# Patient Record
Sex: FEMALE | Race: WHITE
[De-identification: names, ages, dates, MRNs, and addresses within clinical notes are randomized per-mention and may not be internally consistent; named-entity substitution may affect disease eponyms.]

---

## 2018-09-29 ENCOUNTER — HOSPITAL ENCOUNTER (EMERGENCY)
Dept: HOSPITAL 68 - ERH | Age: 33
End: 2018-09-29
Payer: COMMERCIAL

## 2018-09-29 VITALS — SYSTOLIC BLOOD PRESSURE: 122 MMHG | DIASTOLIC BLOOD PRESSURE: 82 MMHG

## 2018-09-29 VITALS — HEIGHT: 68 IN | BODY MASS INDEX: 18.94 KG/M2 | WEIGHT: 125 LBS

## 2018-09-29 DIAGNOSIS — L23.81: Primary | ICD-10-CM

## 2018-09-29 NOTE — ED SKIN/ALLERGY COMPLAINT
History of Present Illness
 
General
Chief Complaint: General Adult
Stated Complaint: RASH TO ARMS AND STOMACH X3 DAYS
Source: patient, old records
Exam Limitations: no limitations
 
Vital Signs & Intake/Output
Vital Signs & Intake/Output
 Vital Signs
 
 
Date Time Temp Pulse Resp B/P B/P Pulse O2 O2 Flow FiO2
 
     Mean Ox Delivery Rate 
 
09/29 0825 99.9 90 16 122/82  96 Room Air  
 
 
 
Allergies
Coded Allergies:
NO KNOWN ALLERGIES (DENIES) (05/01/16)
 
Reconcile Medications
Bupropion Hydrochloride (Wellbutrin XL) 300 MG T24   1 TAB PO DAILY depression  
(Reported)
     Reason to Stop at ADM:held on admission due to alcohol intoxication
Hydroxyzine Pamoate (Vistaril) 25 MG CAPSULE   1 CAP PO TID PRN ITCH
Ibuprofen 600 MG TABLET   1 TAB PO Q6P PRN pain
     with food
Lisdexamfetamine Dimesylate (Vyvanse) 60 MG CAP   1 CAP PO QAM ADD  (Reported)
Medroxyprogesterone Acetate (Depo-Provera) 150 MG/ML SYRINGE   1 ML IM Q3M BIRTH
CONTROL  (Reported)
Methylprednisolone. (Medrol) 4 MG TAB.DS.PK   1 DP PO AD CONTACT DERMATITIS
     6 on day 1 then reduce by one tablet daily until gone
Tramadol HCl (Ultram) 50 MG TABLET   1 TAB PO Q6P PRN severe pain
 
Triage Note:
PT PRESENTS TO THE ER WITH RASH TO STOMACH AND
 ARMS.  PT STATES "THE ONLY THING I DID DIFFERENT
 WAS PLAY WITH A HAMSTER"
 PT STATES THE RASH IS ITCHY, NOT PAINFUL.
Triage Nurses Notes Reviewed? yes
Pregnant: No
Patient currently breastfeeds: No
HPI:
Patient presents to the emergency department with an itchy rash on her arms and 
torso.  The only thing that she can think of was a few days ago she was playing 
with friends hamster and hips was crawling all over her.  Patient has not 
noticed any fleas.  There is no rash on her legs nor her back.  The rash is 
itchy.  The rash is not spreading.  She first noticed the rash the day after 
playing with a hamster.  It is slightly better however still there.  There is no
pain.  There are no fevers or chills.
 
Past History
 
Travel History
Traveled to [place] past 21 day No
 
Medical History
Any Pertinent Medical History? see below for history
Neurological: NONE
EENT: NONE
Cardiovascular: NONE
Respiratory: NONE
Gastrointestinal: NONE
Hepatic: NONE
Renal: NONE
Musculoskeletal: R FOOT BUNION
Psychiatric: alcohol dependence, anxiety
Endocrine: NONE
Blood Disorders: NONE
Cancer(s): NONE
GYN/Reproductive: NONE
History of MRSA: No
History of VRE: No
History of CDIFF: No
Tetanus Vaccine: 03/22/12
 
Surgical History
Surgical History: non-contributory
 
Psychosocial History
Who do you live with Other (see notes)
Services at Home None
What is your primary language English
Tobacco Use: Never used
ETOH Use: heavy use
Illicit Drug Use: marijuana
 
Family History
Hx Contributory? No
 
Review of Systems
 
Review of Systems
Constitutional:
Reports: no symptoms. 
Respiratory:
Reports: no symptoms. 
Cardiovascular:
Reports: no symptoms. 
Musculoskeletal:
Reports: no symptoms. 
Skin:
Reports: see HPI, rash. 
Neurological/Psychological:
Reports: no symptoms. 
Immunologic/Allergic:
Reports: no symptoms. 
 
Physical Exam
 
Physical Exam
General Appearance: well developed/nourished, alert, awake, mild distress
Eyes:
Bilateral: PERRL, EOMI. 
Neck: normal inspection, supple
Respiratory: normal breath sounds, chest non-tender, no respiratory distress, 
lungs clear
Cardiovascular: regular rate/rhythm, normal peripheral pulses
Neurologic/Psych: no motor/sensory deficits, awake, alert, oriented x 3, normal 
gait, normal mood/affect
Skin: rash
Skin Problem Location: upper extremities, torso
Skin Problem Character: papules
Lymphatic: NO LAD
 
Progress
Differential Diagnosis: contact dermatitis
Plan of Care:
STEROIDS AND VISTARL
Comments:
No evidence of scabies, no fleas seen, no evidence of ringworm
 
Departure
 
Departure
Disposition: HOME OR SELF CARE
Condition: Stable
Clinical Impression
Primary Impression: Contact dermatitis
Qualifiers:  Contact dermatitis type: unspecified Contact dermatitis trigger: 
animal dander Qualified Code: L23.81 - Allergic contact dermatitis due to animal
(cat) (dog) dander
Referrals:
Alee LOZANO,Kat RAY (PCP/Family)
 
Additional Instructions:
TAKE VISTERIL AS NEEDED FOR THE ITCH
TAKE MEDROL DOSE PACK ASPRESCRIBED
RETURN IF SYMPTOMS WORSEN OR FOR ANY CONCERNS
Departure Forms:
Customer Survey
General Discharge Information
Prescriptions:
Current Visit Scripts
Methylprednisolone. (Medrol) 1 DP PO AD  
     #1 DP 
     6 on day 1 then reduce by one tablet daily until gone
 
Hydroxyzine Pamoate (Vistaril) 1 CAP PO TID PRN ITCH 
     #30 CAP